# Patient Record
Sex: MALE | Race: WHITE | HISPANIC OR LATINO | URBAN - METROPOLITAN AREA
[De-identification: names, ages, dates, MRNs, and addresses within clinical notes are randomized per-mention and may not be internally consistent; named-entity substitution may affect disease eponyms.]

---

## 2021-10-07 ENCOUNTER — OFFICE VISIT (OUTPATIENT)
Dept: URBAN - METROPOLITAN AREA CLINIC 113 | Facility: CLINIC | Age: 41
End: 2021-10-07
Payer: COMMERCIAL

## 2021-10-07 ENCOUNTER — LAB OUTSIDE AN ENCOUNTER (OUTPATIENT)
Dept: URBAN - METROPOLITAN AREA CLINIC 113 | Facility: CLINIC | Age: 41
End: 2021-10-07

## 2021-10-07 ENCOUNTER — WEB ENCOUNTER (OUTPATIENT)
Dept: URBAN - METROPOLITAN AREA CLINIC 113 | Facility: CLINIC | Age: 41
End: 2021-10-07

## 2021-10-07 VITALS
DIASTOLIC BLOOD PRESSURE: 73 MMHG | SYSTOLIC BLOOD PRESSURE: 120 MMHG | HEART RATE: 71 BPM | WEIGHT: 180 LBS | RESPIRATION RATE: 20 BRPM | BODY MASS INDEX: 25.77 KG/M2 | TEMPERATURE: 98 F | HEIGHT: 70 IN

## 2021-10-07 DIAGNOSIS — K62.5 RECTAL BLEEDING: ICD-10-CM

## 2021-10-07 PROCEDURE — 99204 OFFICE O/P NEW MOD 45 MIN: CPT | Performed by: INTERNAL MEDICINE

## 2021-10-07 RX ORDER — SODIUM, POTASSIUM,MAG SULFATES 17.5-3.13G
354ML SOLUTION, RECONSTITUTED, ORAL ORAL
Qty: 354 MILLILITER | Refills: 1 | OUTPATIENT
Start: 2021-10-07 | End: 2021-12-05

## 2021-10-07 RX ORDER — SODIUM PICOSULFATE, MAGNESIUM OXIDE, AND ANHYDROUS CITRIC ACID 10; 3.5; 12 MG/160ML; G/160ML; G/160ML
160 ML LIQUID ORAL
Qty: 320 MILLILITER | Refills: 1 | OUTPATIENT
Start: 2021-10-07 | End: 2021-10-09

## 2021-10-07 NOTE — HPI-TODAY'S VISIT:
The patient is a 41-year-old male who presents with 2 distinct episodes of voluminous bright red rectal bleeding which occurred last week.  These were associated with lower abdominal discomfort.  He saw his primary care physician after they occurred, and was prescribed a hydrocortisone suppository.  The bleeding has stopped now, and he has had no bowel movement since.  The lower abdominal pain is also resolved.  He has had no recent change in bowel habits, although he has had excessive gas and bloating for years.  He has had no prior episode of this type, and denies associated fever, chills, sweats, etc.  There is no family history of Crohn's disease, ulcerative colitis, or colon cancer.  He has never had a colonoscopy.

## 2021-10-14 ENCOUNTER — OFFICE VISIT (OUTPATIENT)
Dept: URBAN - METROPOLITAN AREA SURGERY CENTER 25 | Facility: SURGERY CENTER | Age: 41
End: 2021-10-14
Payer: COMMERCIAL

## 2021-10-14 DIAGNOSIS — K92.1 HEMATOCHEZIA: ICD-10-CM

## 2021-10-14 DIAGNOSIS — K64.0 GRADE I INTERNAL HEMORRHOIDS: ICD-10-CM

## 2021-10-14 PROCEDURE — G8907 PT DOC NO EVENTS ON DISCHARG: HCPCS | Performed by: INTERNAL MEDICINE

## 2021-10-14 PROCEDURE — 45378 DIAGNOSTIC COLONOSCOPY: CPT | Performed by: INTERNAL MEDICINE

## 2021-10-14 RX ORDER — ANTI-ITCH 1 G/100G
1 APPLICATION OINTMENT TOPICAL ONCE A DAY
Qty: 90 | Refills: 1 | OUTPATIENT
Start: 2021-10-14 | End: 2021-12-12

## 2021-10-14 RX ORDER — SODIUM, POTASSIUM,MAG SULFATES 17.5-3.13G
354ML SOLUTION, RECONSTITUTED, ORAL ORAL
Qty: 354 MILLILITER | Refills: 1 | Status: ACTIVE | COMMUNITY
Start: 2021-10-07 | End: 2021-12-05

## 2021-10-14 RX ORDER — RIFAXIMIN 550 MG/1
1 TABLET TABLET ORAL TWICE A DAY
Qty: 42 TABLET | Refills: 1 | OUTPATIENT
Start: 2021-10-14 | End: 2021-11-03

## 2021-12-15 ENCOUNTER — PREPPED CHART (OUTPATIENT)
Dept: URBAN - METROPOLITAN AREA CLINIC 20 | Facility: CLINIC | Age: 41
End: 2021-12-15

## 2021-12-15 NOTE — PATIENT DISCUSSION
Start: Olopatadine twice a day in both eyes * Lotemax once a day In both eyes * Prolensa once a day in both eyes.

## 2021-12-15 NOTE — PATIENT DISCUSSION
Continue the following treatment(s): ARTIFICIAL TEARS- 1 DROP AS NEEDED THROUGHOUT THE DAY (OK TO USE BOTTLE RATHER THAN PRESERVATIVE FREE VIALS).

## 2022-01-24 ENCOUNTER — DASHBOARD ENCOUNTERS (OUTPATIENT)
Age: 42
End: 2022-01-24

## 2022-01-24 ENCOUNTER — OFFICE VISIT (OUTPATIENT)
Dept: URBAN - METROPOLITAN AREA CLINIC 113 | Facility: CLINIC | Age: 42
End: 2022-01-24
Payer: COMMERCIAL

## 2022-01-24 ENCOUNTER — WEB ENCOUNTER (OUTPATIENT)
Dept: URBAN - METROPOLITAN AREA CLINIC 113 | Facility: CLINIC | Age: 42
End: 2022-01-24

## 2022-01-24 VITALS
BODY MASS INDEX: 25.77 KG/M2 | TEMPERATURE: 98.4 F | WEIGHT: 180 LBS | HEIGHT: 70 IN | DIASTOLIC BLOOD PRESSURE: 74 MMHG | HEART RATE: 67 BPM | RESPIRATION RATE: 20 BRPM | SYSTOLIC BLOOD PRESSURE: 128 MMHG

## 2022-01-24 DIAGNOSIS — K62.5 RECTAL BLEEDING: ICD-10-CM

## 2022-01-24 PROCEDURE — 99213 OFFICE O/P EST LOW 20 MIN: CPT | Performed by: INTERNAL MEDICINE

## 2022-01-24 NOTE — HPI-TODAY'S VISIT:
The patient is a 41-year-old male who presented  initially on 10/7/21 with 2 distinct episodes of voluminous bright red rectal bleeding.  These were associated with lower abdominal discomfort.  He saw his primary care physician after they occurred, and was prescribed a hydrocortisone suppository.  The bleeding has stopped now, and he has had no bowel movement since.  The lower abdominal pain is also resolved.  He has had no recent change in bowel habits, although he has had excessive gas and bloating for years.  He has had no prior episode of this type, and denies associated fever, chills, sweats, etc.  There is no family history of Crohn's disease, ulcerative colitis, or colon cancer.  He had never had a colonoscopy.   The patient underwent colonoscopic examination on October 14, 2021.  This revealed grade 1 internal hemorrhoids and left colon diverticulosis, but no other abnormalities.  A follow-up screening colonoscopy is recommended for 10 years, i.e. in October 2031. The patient has had no more rectal bleeding.  He denies any abdominal pain, constipation, diarrhea, etc.  Occasionally, he will have some swelling of external hemorrhoids which spontaneously resolves.  Otherwise, he is doing well.

## 2022-02-11 ENCOUNTER — ESTABLISHED PATIENT (OUTPATIENT)
Dept: URBAN - METROPOLITAN AREA CLINIC 20 | Facility: CLINIC | Age: 42
End: 2022-02-11

## 2022-02-11 DIAGNOSIS — H10.45: ICD-10-CM

## 2022-02-11 PROCEDURE — 99214 OFFICE O/P EST MOD 30 MIN: CPT

## 2022-02-11 RX ORDER — SODIUM CHLORIDE 50 MG/G: OINTMENT OPHTHALMIC EVERY EVENING

## 2022-02-11 RX ORDER — LOTEPREDNOL ETABONATE 5 MG/G: 1/2 OINTMENT OPHTHALMIC EVERY EVENING

## 2022-02-11 ASSESSMENT — TONOMETRY
OS_IOP_MMHG: 16
OD_IOP_MMHG: 14

## 2022-02-11 ASSESSMENT — VISUAL ACUITY
OD_SC: 20/20
OU_SC: J1
OS_SC: 20/20

## 2022-02-11 NOTE — PATIENT DISCUSSION
2/11/22 PATIENT IS STILL HAVING A HARD TIME WITH PAIN IN MORNINGS.  ** HE USES GOGGLES AT NIGHT THAT HAVE MOIST SPONGES TO KEEP THE EYE CLOSED AND MOIST.  IT IS THE ONLY THING THAT HELPS. USING OINTMENT WILL HELP-- ESPECIALLY ELPIDIO 128, BUT THE PATIENT THINKS IT DOES NOT MAKE A DIFFERENCE.  * DR Elicia Campa RECOMMENDS TRYING ELPIDIO 128 AGAIN, ALONG WITH THE GOGGLES.  WILL FOLLOW UP IN A FEW WEEKS.

## 2022-02-11 NOTE — PATIENT DISCUSSION
MODIFY DROPS 2/11/22: PATADAY twice a day in both eyes * Lotemax OINTMENT IN EVENING once a day In both eyes * Prolensa once a day in both eyes.

## 2022-03-25 ENCOUNTER — ESTABLISHED PATIENT (OUTPATIENT)
Dept: URBAN - METROPOLITAN AREA CLINIC 20 | Facility: CLINIC | Age: 42
End: 2022-03-25

## 2022-03-25 DIAGNOSIS — H10.45: ICD-10-CM

## 2022-03-25 DIAGNOSIS — H02.88B: ICD-10-CM

## 2022-03-25 DIAGNOSIS — H02.88A: ICD-10-CM

## 2022-03-25 DIAGNOSIS — H11.153: ICD-10-CM

## 2022-03-25 PROCEDURE — 99213 OFFICE O/P EST LOW 20 MIN: CPT

## 2022-03-25 ASSESSMENT — VISUAL ACUITY
OD_SC: 20/20-2
OU_SC: J1+
OS_SC: 20/20-2

## 2022-03-25 ASSESSMENT — TONOMETRY
OS_IOP_MMHG: 15
OD_IOP_MMHG: 16

## 2022-03-25 NOTE — PATIENT DISCUSSION
STOP: PATADAY and PROLENSA drops- (poss sensitivity to preservatives) CONTINUE: * Lotemax OINTMENT IN EVENING each eye.

## 2022-03-25 NOTE — PATIENT DISCUSSION
** PATIENT MAY HAVE A SENSITIVITY TO PRESERVATIVES.  DR Renetta Gonzalez WANTS PT TO DISCONTINUE ALL DROPS EXCEPT LOTEMAX OINTMENT IN THE EVENING.

## 2022-03-25 NOTE — PATIENT DISCUSSION
2/11/22 PATIENT IS STILL HAVING A HARD TIME WITH PAIN IN MORNINGS.  ** HE USES GOGGLES AT NIGHT THAT HAVE MOIST SPONGES TO KEEP THE EYE CLOSED AND MOIST.  IT IS THE ONLY THING THAT HELPS. USING OINTMENT WILL HELP-- ESPECIALLY ELPIDIO 128, BUT THE PATIENT THINKS IT DOES NOT MAKE A DIFFERENCE.  * DR Jazmín Rooney RECOMMENDS TRYING ELPIDIO 128 AGAIN, ALONG WITH THE GOGGLES.  WILL FOLLOW UP IN A FEW WEEKS.

## 2022-05-13 ENCOUNTER — ESTABLISHED PATIENT (OUTPATIENT)
Dept: URBAN - METROPOLITAN AREA CLINIC 20 | Facility: CLINIC | Age: 42
End: 2022-05-13

## 2022-05-13 DIAGNOSIS — H02.88B: ICD-10-CM

## 2022-05-13 DIAGNOSIS — H10.45: ICD-10-CM

## 2022-05-13 DIAGNOSIS — H02.88A: ICD-10-CM

## 2022-05-13 PROCEDURE — 99213 OFFICE O/P EST LOW 20 MIN: CPT

## 2022-05-13 PROCEDURE — 0330T TEAR FILM IMG UNI/BI W/I&R: CPT

## 2022-05-13 ASSESSMENT — TONOMETRY
OD_IOP_MMHG: 17
OS_IOP_MMHG: 16

## 2022-05-13 ASSESSMENT — VISUAL ACUITY
OD_SC: 20/20
OS_SC: 20/20

## 2022-05-13 NOTE — PATIENT DISCUSSION
2/11/22 PATIENT IS STILL HAVING A HARD TIME WITH PAIN IN MORNINGS.  ** HE USES GOGGLES AT NIGHT THAT HAVE MOIST SPONGES TO KEEP THE EYE CLOSED AND MOIST.  IT IS THE ONLY THING THAT HELPS. USING OINTMENT WILL HELP-- ESPECIALLY ELPIDIO 128, BUT THE PATIENT THINKS IT DOES NOT MAKE A DIFFERENCE.  * DR Cano Poag RECOMMENDS TRYING ELPIDIO 128 AGAIN, ALONG WITH THE GOGGLES.  WILL FOLLOW UP IN A FEW WEEKS.

## 2022-05-13 NOTE — PATIENT DISCUSSION
** Since last visit, patient has started oral medication for allergies and NOT USING PATADAY in the eyes anymore.

## 2022-05-13 NOTE — PATIENT DISCUSSION
** PATIENT MAY HAVE A SENSITIVITY TO PRESERVATIVES.  DR Sravani Cho WANTS PT TO DISCONTINUE ALL DROPS EXCEPT LOTEMAX OINTMENT IN THE EVENING.

## 2022-06-10 ENCOUNTER — ESTABLISHED PATIENT (OUTPATIENT)
Dept: URBAN - METROPOLITAN AREA CLINIC 20 | Facility: CLINIC | Age: 42
End: 2022-06-10

## 2022-06-10 DIAGNOSIS — H02.88A: ICD-10-CM

## 2022-06-10 DIAGNOSIS — H10.45: ICD-10-CM

## 2022-06-10 DIAGNOSIS — H02.88B: ICD-10-CM

## 2022-06-10 PROCEDURE — 66999PR

## 2022-06-10 PROCEDURE — 66999TH

## 2022-06-10 PROCEDURE — 99211T TECH SERVICE

## 2022-06-10 ASSESSMENT — VISUAL ACUITY
OS_SC: 20/20
OD_SC: 20/20
OU_SC: J1+
OU_SC: 20/20

## 2022-06-10 ASSESSMENT — TONOMETRY
OD_IOP_MMHG: 17
OS_IOP_MMHG: 18

## 2022-06-10 NOTE — PATIENT DISCUSSION
** PATIENT MAY HAVE A SENSITIVITY TO PRESERVATIVES.  DR Gerald Mahmood WANTS PT TO DISCONTINUE ALL DROPS EXCEPT LOTEMAX OINTMENT IN THE EVENING.

## 2022-06-10 NOTE — PATIENT DISCUSSION
2/11/22 PATIENT IS STILL HAVING A HARD TIME WITH PAIN IN MORNINGS.  ** HE USES GOGGLES AT NIGHT THAT HAVE MOIST SPONGES TO KEEP THE EYE CLOSED AND MOIST.  IT IS THE ONLY THING THAT HELPS. USING OINTMENT WILL HELP-- ESPECIALLY ELPIDIO 128, BUT THE PATIENT THINKS IT DOES NOT MAKE A DIFFERENCE.  * DR Ronnie Car RECOMMENDS TRYING ELPIDIO 128 AGAIN, ALONG WITH THE GOGGLES.  WILL FOLLOW UP IN A FEW WEEKS.

## 2022-06-10 NOTE — PROCEDURE NOTE: CLINICAL
PROCEDURE NOTE: Thermi Lid and Probing OU. Diagnosis: Meibomian Gland Dysfunction. The risks, benefits and alternatives of the procedure were discussed with the patient. The patient read and signed the consent form, was identified, and seated in the exam chair. After anesthesia prep was performed as above. There was a surgical time out to verify the patient, eye and medication. Slit lamp exam was preformed to view eyelid margins and Meibomian glands. Winferd Sandeep images were reviewed to establish location of specific target areas. Meibomian gland probes were used to unblock the obstructed Meibomian orifices. The eyelid tissue was warmed within a therapeutic target range of 36-38 degrees Celsius to melt the meibum blocking the orifices. Compression of the eyelid was used to express the melted meibum through the orifices. The eye was irrigated with sterile eye rinse solution. Patient tolerated procedure well. There were no complications. Post procedure instructions given. Mariela Valiente

## 2022-08-12 ENCOUNTER — ESTABLISHED PATIENT (OUTPATIENT)
Dept: URBAN - METROPOLITAN AREA CLINIC 20 | Facility: CLINIC | Age: 42
End: 2022-08-12

## 2022-08-12 DIAGNOSIS — H10.45: ICD-10-CM

## 2022-08-12 DIAGNOSIS — H02.88A: ICD-10-CM

## 2022-08-12 DIAGNOSIS — H02.88B: ICD-10-CM

## 2022-08-12 DIAGNOSIS — H16.223: ICD-10-CM

## 2022-08-12 PROCEDURE — 99213 OFFICE O/P EST LOW 20 MIN: CPT

## 2022-08-12 PROCEDURE — 68801 DILATE TEAR DUCT OPENING: CPT

## 2022-08-12 RX ORDER — KETOTIFEN FUMARATE 0.25 MG/ML
1 SOLUTION/ DROPS OPHTHALMIC TWICE A DAY
Start: 2022-08-12

## 2022-08-12 ASSESSMENT — TONOMETRY
OS_IOP_MMHG: 20
OD_IOP_MMHG: 17

## 2022-08-12 ASSESSMENT — VISUAL ACUITY
OU_SC: 20/20
OS_SC: 20/20
OU_SC: J1+
OD_SC: 20/20

## 2022-08-12 NOTE — PROCEDURE NOTE: CLINICAL
PROCEDURE NOTE: Dilation of Lacrimal Punctum, With or Without Irrigation Bilateral Lower Lids. Diagnosis: Mild Keratoconjunctivitis Sicca. Prior to treatment, the risks/benefits/alternatives were discussed. The patient wished to proceed with procedure. Patient tolerated procedure well. There were no complications. Post op instructions given. // NO OBSTRUCTION OU.

## 2022-08-12 NOTE — PATIENT DISCUSSION
2/11/22 PATIENT IS STILL HAVING A HARD TIME WITH PAIN IN MORNINGS.  ** HE USES GOGGLES AT NIGHT THAT HAVE MOIST SPONGES TO KEEP THE EYE CLOSED AND MOIST.  IT IS THE ONLY THING THAT HELPS. USING OINTMENT WILL HELP-- ESPECIALLY ELPIDIO 128, BUT THE PATIENT THINKS IT DOES NOT MAKE A DIFFERENCE.  * DR Renetta Gonzalez RECOMMENDS TRYING ELPIDIO 128 AGAIN, ALONG WITH THE GOGGLES.  WILL FOLLOW UP IN A FEW WEEKS.

## 2022-08-12 NOTE — PATIENT DISCUSSION
CONTINUE: * Lotemax OINTMENT IN EVENING each eye 1-2 times per week if needed. OK not to use if not needed.

## 2022-10-21 ENCOUNTER — ESTABLISHED PATIENT (OUTPATIENT)
Dept: URBAN - METROPOLITAN AREA CLINIC 20 | Facility: CLINIC | Age: 42
End: 2022-10-21

## 2022-10-21 DIAGNOSIS — H10.45: ICD-10-CM

## 2022-10-21 DIAGNOSIS — H16.223: ICD-10-CM

## 2022-10-21 PROCEDURE — 99213 OFFICE O/P EST LOW 20 MIN: CPT

## 2022-10-21 ASSESSMENT — TONOMETRY
OS_IOP_MMHG: 18
OD_IOP_MMHG: 16

## 2022-10-21 ASSESSMENT — VISUAL ACUITY
OS_SC: 20/20
OU_SC: 20/20
OD_SC: 20/20

## 2022-10-21 NOTE — PATIENT DISCUSSION
*Modify: Lotemax OINTMENT IN EVENING: Increase to 3x/week. - Both Eyes-- OK not to use if not needed.

## 2022-10-21 NOTE — PATIENT DISCUSSION
2/11/22 PATIENT IS STILL HAVING A HARD TIME WITH PAIN IN MORNINGS.  ** HE USES GOGGLES AT NIGHT THAT HAVE MOIST SPONGES TO KEEP THE EYE CLOSED AND MOIST.  IT IS THE ONLY THING THAT HELPS. USING OINTMENT WILL HELP-- ESPECIALLY ELPIDIO 128, BUT THE PATIENT THINKS IT DOES NOT MAKE A DIFFERENCE.  * DR Marilin Salvador RECOMMENDS TRYING ELPIDIO 128 AGAIN, ALONG WITH THE GOGGLES.  WILL FOLLOW UP IN A FEW WEEKS.

## 2023-01-27 ENCOUNTER — FOLLOW UP (OUTPATIENT)
Dept: URBAN - METROPOLITAN AREA CLINIC 20 | Facility: CLINIC | Age: 43
End: 2023-01-27

## 2023-01-27 DIAGNOSIS — H16.223: ICD-10-CM

## 2023-01-27 DIAGNOSIS — H10.45: ICD-10-CM

## 2023-01-27 PROCEDURE — 99213 OFFICE O/P EST LOW 20 MIN: CPT

## 2023-01-27 ASSESSMENT — TONOMETRY
OS_IOP_MMHG: 12
OD_IOP_MMHG: 13

## 2023-01-27 ASSESSMENT — VISUAL ACUITY
OU_SC: J1+
OD_SC: 20/20
OU_SC: 20/20
OS_SC: 20/20

## 2023-01-27 NOTE — PATIENT DISCUSSION
*Continue: Lotemax OINTMENT IN EVENING: Increase to 3x/week. - Both Eyes-- OK not to use if not needed.

## 2023-01-27 NOTE — PATIENT DISCUSSION
* Pt saw Dr. Sushil Bashir in Beginning of Dec.  Has started shot therapy with him.  Will Monitor symptoms over next few months.

## 2024-02-25 NOTE — PATIENT DISCUSSION
Discharge instructions reviewed with patient . Patient  verbalizing understanding the importance of following up and any concerns that would require patient to come back to the ED. IV removed . Vitals stable. Patient able to dress self and ambulated out of ER.      Monitor.